# Patient Record
Sex: MALE | Race: WHITE | NOT HISPANIC OR LATINO | Employment: FULL TIME | URBAN - METROPOLITAN AREA
[De-identification: names, ages, dates, MRNs, and addresses within clinical notes are randomized per-mention and may not be internally consistent; named-entity substitution may affect disease eponyms.]

---

## 2018-01-31 ENCOUNTER — HOSPITAL ENCOUNTER (EMERGENCY)
Facility: HOSPITAL | Age: 58
Discharge: HOME | End: 2018-01-31
Attending: EMERGENCY MEDICINE
Payer: MEDICAID

## 2018-01-31 VITALS
DIASTOLIC BLOOD PRESSURE: 76 MMHG | SYSTOLIC BLOOD PRESSURE: 111 MMHG | OXYGEN SATURATION: 99 % | RESPIRATION RATE: 18 BRPM | HEART RATE: 92 BPM | WEIGHT: 180 LBS | TEMPERATURE: 98 F | HEIGHT: 73 IN | BODY MASS INDEX: 23.86 KG/M2
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
## 2018-01-31 DIAGNOSIS — M25.462 EFFUSION OF LEFT KNEE: Primary | ICD-10-CM

## 2018-01-31 PROCEDURE — 25000003 PHARM REV CODE 250: Performed by: NURSE PRACTITIONER

## 2018-01-31 PROCEDURE — 20610 DRAIN/INJ JOINT/BURSA W/O US: CPT | Mod: LT

## 2018-01-31 PROCEDURE — 99283 EMERGENCY DEPT VISIT LOW MDM: CPT | Mod: 25

## 2018-01-31 RX ORDER — LIDOCAINE HYDROCHLORIDE AND EPINEPHRINE 10; 10 MG/ML; UG/ML
1 INJECTION, SOLUTION INFILTRATION; PERINEURAL
Status: COMPLETED | OUTPATIENT
Start: 2018-01-31 | End: 2018-01-31

## 2018-01-31 RX ORDER — SULINDAC 150 MG/1
150 TABLET ORAL 2 TIMES DAILY
Qty: 10 TABLET | Refills: 0 | Status: SHIPPED | OUTPATIENT
Start: 2018-01-31 | End: 2021-06-16 | Stop reason: CLARIF

## 2018-01-31 RX ADMIN — LIDOCAINE HYDROCHLORIDE AND EPINEPHRINE 1 ML: 10; 10 INJECTION, SOLUTION INFILTRATION; PERINEURAL at 01:01

## 2018-01-31 NOTE — ED TRIAGE NOTES
Pt. C/o left knee pain- stated he fell on yesterday and noted swollen to his knee and pain on today.

## 2018-01-31 NOTE — ED TRIAGE NOTES
Pt reports he accidentally stepped into a hole and fell down onto his left knee. Pt with c/o left knee pain. Swelling, tenderness noted to left knee. No obvious deformity.

## 2018-01-31 NOTE — DISCHARGE INSTRUCTIONS
Ice and rest your knee today.  Keep ace wrap in place.  Elevate regularly. Return to the Emergency department for any worsening or failure to improve, otherwise follow up with your primary care provider.

## 2018-02-01 NOTE — ED PROVIDER NOTES
Encounter Date: 1/31/2018       History     Chief Complaint   Patient presents with    Knee Pain     left knee pain after trip and fall yesterday     Chief complaint: Knee pain    History of present illness: Patient is a 57-year-old male who reports that he stepped in a hole and landed on his left knee one day ago and states the pain is constantly gotten worse since then.  He reports the pain is shooting.  He reports having used a pain patch in the region without relief.  States that there is associated swelling and tenderness.  Walking causes increased pain.  Current severity pain is 9 out of 10.      The history is provided by the patient. No  was used.     Review of patient's allergies indicates:  No Known Allergies  History reviewed. No pertinent past medical history.  History reviewed. No pertinent surgical history.  Family History   Problem Relation Age of Onset    Hypertension Sister      Social History   Substance Use Topics    Smoking status: Current Every Day Smoker     Packs/day: 1.00     Types: Cigarettes    Smokeless tobacco: Never Used    Alcohol use No     Review of Systems   Constitutional: Negative for appetite change, chills, diaphoresis, fatigue and fever.   HENT: Negative for congestion, ear discharge, ear pain, postnasal drip, rhinorrhea, sinus pressure, sneezing, sore throat and voice change.    Eyes: Negative for discharge, itching and visual disturbance.   Respiratory: Negative for cough, shortness of breath and wheezing.    Cardiovascular: Negative for chest pain, palpitations and leg swelling.   Gastrointestinal: Negative for abdominal pain, nausea and vomiting.   Endocrine: Negative for polydipsia, polyphagia and polyuria.   Genitourinary: Negative for difficulty urinating, discharge, dysuria, frequency, hematuria, penile pain, penile swelling and urgency.   Musculoskeletal: Positive for arthralgias and joint swelling. Negative for myalgias.   Skin: Negative for  rash and wound.   Neurological: Negative for dizziness, seizures, syncope and weakness.   Hematological: Negative for adenopathy. Does not bruise/bleed easily.   Psychiatric/Behavioral: Negative for agitation and self-injury. The patient is not nervous/anxious.        Physical Exam     Initial Vitals [01/31/18 0941]   BP Pulse Resp Temp SpO2   110/81 100 20 97.2 °F (36.2 °C) 97 %      MAP       90.67         Physical Exam    Nursing note and vitals reviewed.  Constitutional: He appears well-developed and well-nourished. He is not diaphoretic. No distress.   HENT:   Head: Normocephalic and atraumatic.   Right Ear: External ear normal.   Left Ear: External ear normal.   Nose: Nose normal.   Eyes: Pupils are equal, round, and reactive to light. Right eye exhibits no discharge. Left eye exhibits no discharge. No scleral icterus.   Neck: Normal range of motion.   Pulmonary/Chest: No respiratory distress.   Abdominal: He exhibits no distension.   Musculoskeletal:        Left knee: He exhibits decreased range of motion, swelling and effusion. He exhibits no ecchymosis, no deformity, no laceration, no erythema, normal alignment, no LCL laxity, normal patellar mobility, no bony tenderness, normal meniscus and no MCL laxity. No tenderness found.   Neurological: He is alert and oriented to person, place, and time.   Skin: Skin is dry. Capillary refill takes less than 2 seconds.         ED Course   Arthrocentesis  Date/Time: 1/31/2018 7:20 PM  Location procedure was performed: [facility] EMERGENCY DEPARTMENT  Performed by: CORDELL BERTRAND  Authorized by: ERIKA GONZALES   Pre-op diagnosis: left suprapatellar reflex  Post-op diagnosis: left suprapatellar reflex  Consent Done: Yes  Consent: Verbal consent obtained. Written consent obtained.  Risks and benefits: risks, benefits and alternatives were discussed  Consent given by: patient  Patient understanding: patient states understanding of the procedure being performed  Patient  "consent: the patient's understanding of the procedure matches consent given  Procedure consent: procedure consent matches procedure scheduled  Relevant documents: relevant documents present and verified  Test results: test results available and properly labeled  Site marked: the operative site was marked  Imaging studies: imaging studies available  Patient identity confirmed: , name and verbally with patient  Time out: Immediately prior to procedure a "time out" was called to verify the correct patient, procedure, equipment, support staff and site/side marked as required.  Indications: joint swelling   Body area: knee  Joint: left knee  Local anesthesia used: yes    Anesthesia:  Local anesthesia used: yes  Local Anesthetic: lidocaine 1% with epinephrine  Anesthetic total: 0.5 mL  Patient sedated: no  Needle size: 18 G  Approach: lateral  Aspirate amount: 75 mL  Aspirate: blood-tinged  Patient tolerance: Patient tolerated the procedure well with no immediate complications        Labs Reviewed - No data to display          Medical Decision Making:   Physical Exam shows a non-toxic, afebrile, and well appearing male who c/o left knee swelling and pain after falling.  Left knee pos for reduced rom and suprapatellar effusion.    Vital Signs Are Reassuring. If available, previous records reviewed.     My overall impression is left knee effusion. I considered, but at this time, do not suspect septic arthritis, gout, fracture, dislocation.    ED Course: see procedure note. D/C Meds: clinoril. Additional D/C Information: r.i.c.e. therapy. The diagnosis, treatment plan, instructions for follow-up and reevaluation with orthopedics as well as ED return precautions were discussed and understanding was verbalized. All questions or concerns have been addressed.     This case was discussed with Dr. Blake who is in agreement with my assessment and plan.                     ED Course as of 1918   Wed 2018 "   1222 Large suprapatellar joint effusion is present which can be seen with an occult fracture or soft tissue injury (ligament or meniscal injury) within the left knee.  No displaced fractures are identified and there is no evidence for dislocation. X-Ray Knee 3 View Left [VC]      ED Course User Index  [VC] Finn Vivar DNP     Clinical Impression:   The primary encounter diagnosis was Effusion of left knee. A diagnosis of Fall was also pertinent to this visit.    Disposition:   Disposition: Discharged  Condition: Stable                        Finn Vivar DNP  01/31/18 1921

## 2021-06-22 PROBLEM — R13.10 PROBLEMS WITH SWALLOWING AND MASTICATION: Status: ACTIVE | Noted: 2021-06-22

## 2021-08-21 ENCOUNTER — HOSPITAL ENCOUNTER (EMERGENCY)
Facility: HOSPITAL | Age: 61
Discharge: HOME | End: 2021-08-22
Attending: EMERGENCY MEDICINE
Payer: MEDICAID

## 2021-08-21 DIAGNOSIS — M54.41 ACUTE RIGHT-SIDED LOW BACK PAIN WITH RIGHT-SIDED SCIATICA: Primary | ICD-10-CM

## 2021-08-21 PROCEDURE — 99284 EMERGENCY DEPT VISIT MOD MDM: CPT | Mod: 25

## 2021-08-21 PROCEDURE — 96372 THER/PROPH/DIAG INJ SC/IM: CPT

## 2021-08-21 RX ORDER — TRAMADOL HYDROCHLORIDE 50 MG/1
50 TABLET ORAL EVERY 4 HOURS PRN
Qty: 10 TABLET | Refills: 0 | Status: SHIPPED | OUTPATIENT
Start: 2021-08-21

## 2021-08-21 RX ORDER — ORPHENADRINE CITRATE 30 MG/ML
60 INJECTION INTRAMUSCULAR; INTRAVENOUS
Status: COMPLETED | OUTPATIENT
Start: 2021-08-22 | End: 2021-08-22

## 2021-08-21 RX ORDER — DEXAMETHASONE SODIUM PHOSPHATE 4 MG/ML
12 INJECTION, SOLUTION INTRA-ARTICULAR; INTRALESIONAL; INTRAMUSCULAR; INTRAVENOUS; SOFT TISSUE
Status: COMPLETED | OUTPATIENT
Start: 2021-08-22 | End: 2021-08-22

## 2021-08-21 RX ORDER — METHOCARBAMOL 500 MG/1
500 TABLET, FILM COATED ORAL 3 TIMES DAILY
Qty: 30 TABLET | Refills: 0 | Status: SHIPPED | OUTPATIENT
Start: 2021-08-21

## 2021-08-21 RX ORDER — TRAMADOL HYDROCHLORIDE 50 MG/1
50 TABLET ORAL
Status: COMPLETED | OUTPATIENT
Start: 2021-08-22 | End: 2021-08-22

## 2021-08-22 VITALS
RESPIRATION RATE: 18 BRPM | WEIGHT: 160 LBS | SYSTOLIC BLOOD PRESSURE: 128 MMHG | DIASTOLIC BLOOD PRESSURE: 76 MMHG | HEART RATE: 75 BPM | BODY MASS INDEX: 22.9 KG/M2 | OXYGEN SATURATION: 98 % | TEMPERATURE: 98 F | HEIGHT: 70 IN

## 2021-08-22 PROCEDURE — 25000003 PHARM REV CODE 250: Performed by: PHYSICIAN ASSISTANT

## 2021-08-22 PROCEDURE — 63600175 PHARM REV CODE 636 W HCPCS: Performed by: PHYSICIAN ASSISTANT

## 2021-08-22 RX ADMIN — DEXAMETHASONE SODIUM PHOSPHATE 12 MG: 4 INJECTION INTRA-ARTICULAR; INTRALESIONAL; INTRAMUSCULAR; INTRAVENOUS; SOFT TISSUE at 12:08

## 2021-08-22 RX ADMIN — ORPHENADRINE CITRATE 60 MG: 60 INJECTION INTRAMUSCULAR; INTRAVENOUS at 12:08

## 2021-08-22 RX ADMIN — TRAMADOL HYDROCHLORIDE 50 MG: 50 TABLET, FILM COATED ORAL at 12:08
